# Patient Record
Sex: MALE | Race: WHITE
[De-identification: names, ages, dates, MRNs, and addresses within clinical notes are randomized per-mention and may not be internally consistent; named-entity substitution may affect disease eponyms.]

---

## 2018-08-19 ENCOUNTER — HOSPITAL ENCOUNTER (EMERGENCY)
Dept: HOSPITAL 44 - ED | Age: 53
Discharge: HOME | End: 2018-08-19
Payer: COMMERCIAL

## 2018-08-19 VITALS
DIASTOLIC BLOOD PRESSURE: 86 MMHG | SYSTOLIC BLOOD PRESSURE: 139 MMHG | DIASTOLIC BLOOD PRESSURE: 86 MMHG | SYSTOLIC BLOOD PRESSURE: 139 MMHG | DIASTOLIC BLOOD PRESSURE: 86 MMHG | SYSTOLIC BLOOD PRESSURE: 139 MMHG
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
## 2018-08-19 DIAGNOSIS — S81.811A: Primary | ICD-10-CM
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
## 2018-08-19 PROCEDURE — 90715 TDAP VACCINE 7 YRS/> IM: CPT

## 2018-08-19 PROCEDURE — 90471 IMMUNIZATION ADMIN: CPT

## 2018-08-19 PROCEDURE — 12002 RPR S/N/AX/GEN/TRNK2.6-7.5CM: CPT

## 2018-08-19 NOTE — ED PHYSICIAN DOCUMENTATION
General Adult





- HISTORIAN


Historian: patient





- HPI


Stated Complaint: right lower leg laceration 


Chief Complaint: Laceration/Recheck/Suture


Onset: hours (1)


Timing: still present


Severity: mild


Further Comments: yes (He states [place] fell [place] a dock [place] dock and

hit his leg with a bolt on the dock. The leg then got a cut. He did not take any

meds. He is not up to date on tetanus)


Last known Well Code/Unknown Code: Unknown





- ROS


CONST: no problems





- PAST HX


Past History: none


Immunizations: tetanus


Allergies/Adverse Reactions: 


                                    Allergies











Allergy/AdvReac Type Severity Reaction Status Date / Time


 


No Known Drug Allergies Allergy   Verified 08/19/18 20:24

















- SOCIAL HX


Smoking History: non-smoker


Alcohol Use: rarely


Drug Use: none





- FAMILY HX


Family History: No





- VITAL SIGNS


Vital Signs: 


                                   Vital Signs











Temp Pulse Resp BP Pulse Ox


 


 98.5 F   92 H  16   139/86   98 


 


 08/19/18 20:10  08/19/18 21:10  08/19/18 21:10  08/19/18 21:10  08/19/18 21:10














- REVIEWED ASSESSMENTS


Nursing Assessment  Reviewed: Yes


Vitals Reviewed: Yes





Procedures


Wound Location: lower extremity


Wound's Depth, Shape: superficial


Wound Explored: clean


Anesthesia: 2% Lidocaine


Wound Repaired With: sutures


Suture Size/Type: 4:0


Number of Sutures: 10





ED Results Lab/Radiology





- Orders


Orders: 


                                    ED Orders











 Category Date Time Status


 


 Cleanse with NS and Chlorhexid 1T Care  08/19/18 20:18 Active


 


 Diph,Pertuss(Acell),Tet Vac/Pf [Adacel] Med  08/19/18 20:20 Discontinued





 0.5 ml IM .ONCE ONE   


 


 Lidocaine 1% 5ml(IM or SUTURE) [Xylocaine] Med  08/19/18 20:18 Discontinued





 50 mg IJ NOW ONE   














General Adult Physical Exam





- PHYSICAL EXAM


GENERAL APPEARANCE: no distress


EENT: eye inspection normal


NECK: normal inspection


RESPIRATORY: no resp distress, chest non-tender, breath sounds normal


CVS: reg rate & rhythm, heart sounds normal, equal pulses, no murmur


ABDOMEN: soft, no distension


BACK: normal inspection


SKIN: warm/dry, normal color, other (right lower leg with a 6 cm approx 

laceration that has no retained material. )


EXTREMITIES: non-tender, normal range of motion, no evidence of injury, no edema


NEURO: oriented X3, CN's nml as tested, motor nml, sensation nml, mood/affect 

nml, cognition normal





Discharge


Clincal Impression: 


Laceration of leg


Qualifiers:


 Encounter type: initial encounter Laterality: right Qualified Code(s): S81.811A

 - Laceration without foreign body, right lower leg, initial encounter





Referrals: 


Mimi Rothman MD [Primary Care Provider] - 2 Days


Additional Instructions: 


1. Bactrim DS Take 1 by mouth BID x 10 days


2. Keep area clean and dry


3. Monitor for symptoms of infection 


4. Follow up with PCP in 7-10 days for suture removal


5. Return to ER for any concerns 


Condition: Stable


Disposition: 01 HOME, SELF-CARE


Decision to Admit: NO


Date of Decison to Admit: 08/19/18


Decision Time: 21:19

## 2019-11-18 ENCOUNTER — HOSPITAL ENCOUNTER (OUTPATIENT)
Dept: HOSPITAL 44 - LAB | Age: 54
End: 2019-11-18
Attending: PODIATRIST
Payer: COMMERCIAL

## 2019-11-18 DIAGNOSIS — B35.1: Primary | ICD-10-CM

## 2019-11-18 PROCEDURE — 80076 HEPATIC FUNCTION PANEL: CPT

## 2019-11-18 PROCEDURE — 36415 COLL VENOUS BLD VENIPUNCTURE: CPT

## 2019-11-19 LAB — PROT SERPL-MCNC: (no result) G/DL

## 2019-12-26 ENCOUNTER — HOSPITAL ENCOUNTER (OUTPATIENT)
Dept: HOSPITAL 44 - LAB | Age: 54
End: 2019-12-26
Attending: PODIATRIST
Payer: COMMERCIAL

## 2019-12-26 DIAGNOSIS — B35.1: Primary | ICD-10-CM

## 2019-12-26 PROCEDURE — 80076 HEPATIC FUNCTION PANEL: CPT

## 2019-12-27 LAB — PROT SERPL-MCNC: (no result) G/DL
